# Patient Record
Sex: MALE | Race: WHITE | Employment: PART TIME | URBAN - METROPOLITAN AREA
[De-identification: names, ages, dates, MRNs, and addresses within clinical notes are randomized per-mention and may not be internally consistent; named-entity substitution may affect disease eponyms.]

---

## 2020-11-06 ENCOUNTER — APPOINTMENT (OUTPATIENT)
Dept: GENERAL RADIOLOGY | Facility: CLINIC | Age: 50
End: 2020-11-06
Attending: EMERGENCY MEDICINE

## 2020-11-06 ENCOUNTER — HOSPITAL ENCOUNTER (EMERGENCY)
Facility: CLINIC | Age: 50
Discharge: HOME | End: 2020-11-06
Attending: EMERGENCY MEDICINE | Admitting: EMERGENCY MEDICINE

## 2020-11-06 VITALS
RESPIRATION RATE: 14 BRPM | TEMPERATURE: 98 F | DIASTOLIC BLOOD PRESSURE: 93 MMHG | HEART RATE: 76 BPM | SYSTOLIC BLOOD PRESSURE: 122 MMHG | OXYGEN SATURATION: 97 %

## 2020-11-06 DIAGNOSIS — M20.011 MALLET FINGER, RIGHT: ICD-10-CM

## 2020-11-06 PROCEDURE — 73140 X-RAY EXAM OF FINGER(S): CPT | Mod: RT

## 2020-11-06 PROCEDURE — 29130 APPL FINGER SPLINT STATIC: CPT | Mod: F7

## 2020-11-06 PROCEDURE — 99284 EMERGENCY DEPT VISIT MOD MDM: CPT

## 2020-11-06 ASSESSMENT — ENCOUNTER SYMPTOMS
FEVER: 0
ARTHRALGIAS: 1

## 2020-11-06 NOTE — ED AVS SNAPSHOT
[facility] Emergency Dept  [address]  [facility] [address]  Phone: [phone]  Fax: [phone]                                    Karel Oviedo   MRN: [id number]    Department: [facility] Emergency Dept   Date of Visit: 11/6/2020           After Visit Summary Signature Page    I have received my discharge instructions, and my questions have been answered. I have discussed any challenges I see with this plan with the nurse or doctor.    ..........................................................................................................................................  Patient/Patient Representative Signature      ..........................................................................................................................................  Patient Representative Print Name and Relationship to Patient    ..................................................               ................................................  Date                                   Time    ..........................................................................................................................................  Reviewed by Signature/Title    ...................................................              ..............................................  Date                                               Time          22EPIC Rev 08/18

## 2020-11-06 NOTE — DISCHARGE INSTRUCTIONS
You will need to keep the splint on for 6-8 weeks.  You will need to be reevaluated during this time and if your function has returned, you will wear the splint at nighttime for an additional 2 to 4 weeks.  If at that time your function has returned to normal, you may discontinue the splint but will need to be reevaluated by your primary care physician.

## 2020-11-06 NOTE — ED TRIAGE NOTES
Patient presents with finger injury to the right middle finger. On Tuesday, he was wiping off glue on his finger when he hyperextended his finger and felt a snap. He can't extend his finger now fully, less swelling and pain.

## 2020-11-06 NOTE — LETTER
November 6, 2020      To Whom It May Concern:      Karel Oviedo was seen in our Emergency Department today, 11/06/20.  He may return to work on 11/7/2020 but will have a splint to his right middle finger that will need to remain in place for 6 to 8 weeks limiting the function of his hand.  He may utilize his right hand as much as the splint tolerates.    Sincerely,        Deon Soriano MD

## 2020-11-06 NOTE — ED PROVIDER NOTES
History     Chief Complaint:  Finger Pain    HPI   Karel Oviedo is an otherwise healthy 50 year old male who presents with right middle finger pain.  Patient reports that 2 days ago he had glue that was stuck to his fingers.  He was rubbing off the glue from his middle finger in full extension.  While doing this, he heard a snap and then was unable to extend his finger at the DIP joint.  There is minimal pain at the DIP joint which is mild, nonradiating and without aggravating factors.  He placed his finger in a makeshift extension splint.  He did not present till today as his car was in the shop but has since been fixed thus prompting his ED evaluation today.    Allergies:  Cephalexin      Medications:    Medications reviewed. No pertinent medications.      Past Medical History:    Medical history reviewed. No pertinent medical history.     Past Surgical History:    External ear procedure     Family History:    Family history reviewed. No pertinent family history.     Social History:  PCP: Chencho Pringle  Marital Status: Single      Review of Systems   Constitutional: Negative for fever.   Musculoskeletal: Positive for arthralgias.   Skin: Negative for rash.   All other systems reviewed and are negative.    Physical Exam     Patient Vitals for the past 24 hrs:   BP Temp Temp src Pulse Resp SpO2   11/06/20 1136 110/82 98  F (36.7  C) Temporal 89 14 98 %     Physical Exam  General:   Pleasant, age appropriate.   EYES:   Conjunctiva normal.  NECK:    Supple, no meningismus.   CV:     Regular rate and rhythm     No murmurs, rubs or gallops.    PULM:    Clear to auscultation bilateral.       No respiratory distress.      No wheezing, rales or stridor.  MSK:     Right middle finger: no rotational deformity.      FDS, FDP tendons intact.      Full extension at PIP joint      Unable to extend DIP joint      No focal bony tenderness      No subungual hematoma or nail injury.  LYMPH:   No cervical  lymphadenopathy.  NEURO:   Alert, good muscle tone     No tremor  SKIN:    Warm, dry and intact.    PSYCH:    Mood is good and affect is appropriate.    Emergency Department Course     Imaging:  Radiology findings were communicated with the patient who voiced understanding of the findings.    XR Finger Right G/E 2 Views  1.  Mild flexion deformity at the DIP joint of the right long finger, compatible with mallet finger deformity. No fracture. No joint subluxation or dislocation.  2.  The remainder of the right hand is unremarkable.  ROSA ANGEL MD  Reading per radiology     Emergency Department Course:    1138 Nursing notes and vitals reviewed.    1139 I performed an exam of the patient as documented above.     1147 The patient was sent for an XR while in the emergency department, results above.     Patient was placed in a stack splint.     1225 Findings and plan explained to the patient. Patient discharged home with instructions regarding supportive care, medications, and reasons to return. The importance of close follow-up was reviewed.     Impression & Plan      Medical Decision Making:  Karel Oviedo is a 50 year old male who presents to the emergency department today for evaluation of traumatic right middle finger pain.  Patient has loss of extension of the DIP joint consistent with mallet finger.  X-rays are unremarkable.  Patient placed in a stack splint in extension of the DIP joint.  Patient informed that splint will need to be in place for 6 to 8 weeks.  If he regains function, he will need splinting for 2 to 4 weeks at night.  If function does not return, he will require referral to hand surgery.    Diagnosis:    ICD-10-CM    1. Mallet finger, right  M20.011      Disposition:   Discharged to home.      Scribe Disclosure:  Vandana HURTADO, am serving as a scribe at 11:39 AM on 11/6/2020 to document services personally performed by Deon Soriano MD based on my observations and the  provider's statements to me.      [facility] EMERGENCY DEPT       Deon Soriano MD  11/06/20 5750